# Patient Record
Sex: FEMALE | Race: WHITE | Employment: OTHER | URBAN - METROPOLITAN AREA
[De-identification: names, ages, dates, MRNs, and addresses within clinical notes are randomized per-mention and may not be internally consistent; named-entity substitution may affect disease eponyms.]

---

## 2017-10-21 ENCOUNTER — HOSPITAL ENCOUNTER (OUTPATIENT)
Age: 72
Discharge: HOME | End: 2017-10-21
Attending: FAMILY MEDICINE
Payer: MEDICARE

## 2017-10-21 VITALS
SYSTOLIC BLOOD PRESSURE: 116 MMHG | RESPIRATION RATE: 20 BRPM | OXYGEN SATURATION: 99 % | WEIGHT: 126 LBS | HEART RATE: 79 BPM | DIASTOLIC BLOOD PRESSURE: 64 MMHG | TEMPERATURE: 98 F | BODY MASS INDEX: 21.51 KG/M2 | HEIGHT: 64 IN

## 2017-10-21 DIAGNOSIS — J02.0 STREPTOCOCCAL SORE THROAT: Primary | ICD-10-CM

## 2017-10-21 PROCEDURE — 99203 OFFICE O/P NEW LOW 30 MIN: CPT

## 2017-10-21 PROCEDURE — 99204 OFFICE O/P NEW MOD 45 MIN: CPT

## 2017-10-21 PROCEDURE — 87430 STREP A AG IA: CPT

## 2017-10-21 RX ORDER — ATORVASTATIN CALCIUM 10 MG/1
10 TABLET, FILM COATED ORAL NIGHTLY
COMMUNITY
End: 2020-01-14

## 2017-10-21 RX ORDER — AMOXICILLIN 875 MG/1
875 TABLET, COATED ORAL 2 TIMES DAILY
Qty: 20 TABLET | Refills: 0 | Status: SHIPPED | OUTPATIENT
Start: 2017-10-21 | End: 2017-10-31

## 2017-10-21 RX ORDER — LEVOTHYROXINE SODIUM 0.12 MG/1
125 TABLET ORAL
COMMUNITY
End: 2020-01-14

## 2017-10-21 RX ORDER — AMLODIPINE BESYLATE 10 MG/1
10 TABLET ORAL DAILY
COMMUNITY
End: 2020-01-14

## 2017-10-21 NOTE — ED INITIAL ASSESSMENT (HPI)
C/o sore throat, chills, cough, runny nose since Thursday; had an episode of diarrhea yesterday;  recently positive for strep

## 2017-10-21 NOTE — ED PROVIDER NOTES
Patient Seen in: [address]    History   Patient presents with:  Sore Throat    Stated Complaint: sore throat     HPI    Patient here with sore throat for 3 days.   No travel,  is sick contact who recently had stre bilateral  NOSE: nasal turbinates boggy  THROAT: post pharynx injected with moderate erythema and +1 tonsillar enlargement bilaterally, uvula midline, no pointing, no stridor  NECK: supple, no adenopathy, no thyromegaly  LUNGS:  no resp distress, lungs yareli

## 2018-01-07 ENCOUNTER — HOSPITAL ENCOUNTER (OUTPATIENT)
Age: 73
Discharge: HOME | End: 2018-01-07
Attending: FAMILY MEDICINE
Payer: MEDICARE

## 2018-01-07 VITALS
DIASTOLIC BLOOD PRESSURE: 69 MMHG | HEART RATE: 88 BPM | SYSTOLIC BLOOD PRESSURE: 150 MMHG | RESPIRATION RATE: 18 BRPM | BODY MASS INDEX: 20.24 KG/M2 | HEIGHT: 64.5 IN | TEMPERATURE: 98 F | WEIGHT: 120 LBS | OXYGEN SATURATION: 100 %

## 2018-01-07 DIAGNOSIS — J02.0 STREPTOCOCCAL SORE THROAT: Primary | ICD-10-CM

## 2018-01-07 DIAGNOSIS — H10.33 ACUTE CONJUNCTIVITIS OF BOTH EYES, UNSPECIFIED ACUTE CONJUNCTIVITIS TYPE: ICD-10-CM

## 2018-01-07 LAB — S PYO AG THROAT QL: POSITIVE

## 2018-01-07 PROCEDURE — 99213 OFFICE O/P EST LOW 20 MIN: CPT

## 2018-01-07 PROCEDURE — 99214 OFFICE O/P EST MOD 30 MIN: CPT

## 2018-01-07 PROCEDURE — 87430 STREP A AG IA: CPT

## 2018-01-07 RX ORDER — TOBRAMYCIN 3 MG/ML
1 SOLUTION/ DROPS OPHTHALMIC EVERY 4 HOURS
Qty: 5 ML | Refills: 0 | Status: SHIPPED | OUTPATIENT
Start: 2018-01-07 | End: 2018-01-14

## 2018-01-07 RX ORDER — AMOXICILLIN 875 MG/1
875 TABLET, COATED ORAL 2 TIMES DAILY
Qty: 20 TABLET | Refills: 0 | Status: SHIPPED | OUTPATIENT
Start: 2018-01-07 | End: 2018-01-17

## 2018-01-07 NOTE — ED INITIAL ASSESSMENT (HPI)
Pt presents to the IC with c/o bilateral eye redness that started this morning. Pt wears contacts and has them in right now. +discharge, yellow. Sick contacts at home with URI's.

## 2018-01-25 ENCOUNTER — HOSPITAL ENCOUNTER (OUTPATIENT)
Age: 73
Discharge: HOME | End: 2018-01-25
Attending: EMERGENCY MEDICINE
Payer: MEDICARE

## 2018-01-25 VITALS
HEART RATE: 73 BPM | WEIGHT: 125 LBS | OXYGEN SATURATION: 100 % | BODY MASS INDEX: 21.34 KG/M2 | HEIGHT: 64 IN | SYSTOLIC BLOOD PRESSURE: 137 MMHG | RESPIRATION RATE: 14 BRPM | TEMPERATURE: 98 F | DIASTOLIC BLOOD PRESSURE: 82 MMHG

## 2018-01-25 DIAGNOSIS — J02.0 STREP PHARYNGITIS: Primary | ICD-10-CM

## 2018-01-25 LAB — S PYO AG THROAT QL: POSITIVE

## 2018-01-25 PROCEDURE — 87430 STREP A AG IA: CPT

## 2018-01-25 PROCEDURE — 99214 OFFICE O/P EST MOD 30 MIN: CPT

## 2018-01-25 PROCEDURE — 99213 OFFICE O/P EST LOW 20 MIN: CPT

## 2018-01-25 RX ORDER — PENICILLIN V POTASSIUM 500 MG/1
500 TABLET ORAL 2 TIMES DAILY
Qty: 20 TABLET | Refills: 0 | Status: SHIPPED | OUTPATIENT
Start: 2018-01-25 | End: 2018-02-04

## 2018-01-26 NOTE — ED PROVIDER NOTES
Patient Seen in: [address]    History   Patient presents with:  Sore Throat    Stated Complaint: sore throat exposed to strep     HPI    Patient is a 70-year-old female who presents to immediate care complaining of a so are normal. Pupils are equal, round, and reactive to light. Neck: Normal range of motion. Neck supple. Cardiovascular: Normal rate, regular rhythm and normal heart sounds. No murmur heard.   Pulmonary/Chest: Breath sounds normal. No respiratory distr

## 2019-01-07 ENCOUNTER — HOSPITAL ENCOUNTER (OUTPATIENT)
Age: 74
Discharge: HOME | End: 2019-01-07
Attending: EMERGENCY MEDICINE
Payer: MEDICARE

## 2019-01-07 VITALS
HEIGHT: 64 IN | BODY MASS INDEX: 21.51 KG/M2 | DIASTOLIC BLOOD PRESSURE: 67 MMHG | OXYGEN SATURATION: 99 % | HEART RATE: 107 BPM | WEIGHT: 126 LBS | SYSTOLIC BLOOD PRESSURE: 130 MMHG | RESPIRATION RATE: 20 BRPM | TEMPERATURE: 99 F

## 2019-01-07 DIAGNOSIS — J02.0 STREPTOCOCCAL SORE THROAT: Primary | ICD-10-CM

## 2019-01-07 LAB — S PYO AG THROAT QL: POSITIVE

## 2019-01-07 PROCEDURE — 99214 OFFICE O/P EST MOD 30 MIN: CPT

## 2019-01-07 PROCEDURE — 99213 OFFICE O/P EST LOW 20 MIN: CPT

## 2019-01-07 PROCEDURE — 87430 STREP A AG IA: CPT

## 2019-01-07 RX ORDER — FLUTICASONE PROPIONATE 50 MCG
2 SPRAY, SUSPENSION (ML) NASAL DAILY
Qty: 16 G | Refills: 0 | Status: SHIPPED | OUTPATIENT
Start: 2019-01-07 | End: 2019-02-05

## 2019-01-07 RX ORDER — AMOXICILLIN 875 MG/1
875 TABLET, COATED ORAL 2 TIMES DAILY
Qty: 20 TABLET | Refills: 0 | Status: SHIPPED | OUTPATIENT
Start: 2019-01-07 | End: 2019-01-17

## 2019-01-07 NOTE — ED INITIAL ASSESSMENT (HPI)
Patient states having sore throat pain, nasal congestion, productive and dry cough since last Wednesday. Patient states having low grade fever 100F this morning. Patient denies hest pain or SOB.

## 2019-01-07 NOTE — ED PROVIDER NOTES
Patient Seen in: [address]    History   Patient presents with:  Sore Throat    Stated Complaint: sore throat     HPI    Patient here complaining of sore throat for 3 days.   Notes pain is described as burning and aching clear  EARS:TM's clear bilateral  THROAT: post pharynx injected, uvula midline, no pointing, no stridor  LUNGS: no wheezing, no resp distress, lungs clear bilateral  SKIN: good skin turgor, no obvious rashes  NECK: supple, no meningeal signs, b/l ant.  Cerv

## 2019-02-05 ENCOUNTER — HOSPITAL ENCOUNTER (OUTPATIENT)
Age: 74
Discharge: HOME | End: 2019-02-05
Attending: FAMILY MEDICINE
Payer: MEDICARE

## 2019-02-05 VITALS
SYSTOLIC BLOOD PRESSURE: 151 MMHG | TEMPERATURE: 99 F | WEIGHT: 126 LBS | HEART RATE: 96 BPM | RESPIRATION RATE: 17 BRPM | DIASTOLIC BLOOD PRESSURE: 71 MMHG | BODY MASS INDEX: 21.51 KG/M2 | HEIGHT: 64 IN | OXYGEN SATURATION: 100 %

## 2019-02-05 DIAGNOSIS — J02.0 STREPTOCOCCAL SORE THROAT: Primary | ICD-10-CM

## 2019-02-05 LAB — S PYO AG THROAT QL: POSITIVE

## 2019-02-05 PROCEDURE — 99213 OFFICE O/P EST LOW 20 MIN: CPT

## 2019-02-05 PROCEDURE — 87430 STREP A AG IA: CPT

## 2019-02-05 PROCEDURE — 99214 OFFICE O/P EST MOD 30 MIN: CPT

## 2019-02-05 RX ORDER — AMOXICILLIN AND CLAVULANATE POTASSIUM 875; 125 MG/1; MG/1
1 TABLET, FILM COATED ORAL 2 TIMES DAILY
Qty: 20 TABLET | Refills: 0 | Status: SHIPPED | OUTPATIENT
Start: 2019-02-05 | End: 2019-02-15

## 2019-02-05 NOTE — ED PROVIDER NOTES
Pt seen in [facility]      Stated Complaint: Patient presents with:  Sore Throat      --------------   RN assessment:     st  --------------    CC:  st    HPI:  Cecile Kincaid is a 68year old female--p/w co st, onset was for  jaundice and odynophagia  Genitourinary:negative for decreased stream and nocturia  Behavioral/Psych: negative for aggressive behavior and hallucinations  10 point review of systems is otherwise negative.     Objective   Vitals Ranges and Last Value: Course:  Labs/Tests reviewed:   Labs Reviewed   [facility] POCT RAPID STREP - Abnormal; Notable for the following components:       Result Value    POCT Rapid Strep Positive (*)     All other components within normal limits             ---------------------------

## 2019-02-05 NOTE — ED INITIAL ASSESSMENT (HPI)
Pt presents to the IC with c/o a sore throat. Pt reports a sore throat in early January and was dx with strep throat. Pt notes the symptoms resolved 100% with antibiotics. Symptoms for this round of sore throat started yesterday afternoon. No fever.  No annette

## 2019-03-09 ENCOUNTER — HOSPITAL ENCOUNTER (OUTPATIENT)
Age: 74
Discharge: HOME | End: 2019-03-09
Attending: EMERGENCY MEDICINE
Payer: MEDICARE

## 2019-03-09 VITALS
TEMPERATURE: 98 F | OXYGEN SATURATION: 100 % | HEART RATE: 91 BPM | WEIGHT: 126 LBS | RESPIRATION RATE: 18 BRPM | BODY MASS INDEX: 21.51 KG/M2 | DIASTOLIC BLOOD PRESSURE: 76 MMHG | HEIGHT: 64 IN | SYSTOLIC BLOOD PRESSURE: 130 MMHG

## 2019-03-09 DIAGNOSIS — J02.0 STREPTOCOCCAL SORE THROAT: Primary | ICD-10-CM

## 2019-03-09 LAB — S PYO AG THROAT QL: POSITIVE

## 2019-03-09 PROCEDURE — 99214 OFFICE O/P EST MOD 30 MIN: CPT

## 2019-03-09 PROCEDURE — 99213 OFFICE O/P EST LOW 20 MIN: CPT

## 2019-03-09 PROCEDURE — 87430 STREP A AG IA: CPT

## 2019-03-09 RX ORDER — AMOXICILLIN 875 MG/1
875 TABLET, COATED ORAL 2 TIMES DAILY
Qty: 20 TABLET | Refills: 0 | Status: SHIPPED | OUTPATIENT
Start: 2019-03-09 | End: 2019-03-19

## 2019-03-09 NOTE — ED PROVIDER NOTES
Patient Seen in: [address]    History   Patient presents with:  Sore Throat    Stated Complaint: strep    HPI    Patient here complaining of sore throat for 1 days.   Notes pain is described as achy and sore and rates it normal limits no acute distress  HEAD: normocephalic, atraumatic  EYES: sclera non icteric bilateral, conjunctiva clear  EARS:TM's clear bilateral  THROAT: Tonsils red and inflamed post pharynx injected, uvula midline, no pointing, no stridor  LUNGS: No wh

## 2020-01-14 PROBLEM — I10 ESSENTIAL HYPERTENSION: Status: ACTIVE | Noted: 2020-01-14

## 2020-02-16 ENCOUNTER — HOSPITAL ENCOUNTER (OUTPATIENT)
Age: 75
Discharge: HOME | End: 2020-02-16
Attending: EMERGENCY MEDICINE
Payer: MEDICARE

## 2020-02-16 VITALS
OXYGEN SATURATION: 99 % | WEIGHT: 128 LBS | TEMPERATURE: 100 F | RESPIRATION RATE: 20 BRPM | HEART RATE: 112 BPM | SYSTOLIC BLOOD PRESSURE: 148 MMHG | BODY MASS INDEX: 22 KG/M2 | DIASTOLIC BLOOD PRESSURE: 73 MMHG

## 2020-02-16 DIAGNOSIS — J02.0 STREP PHARYNGITIS: Primary | ICD-10-CM

## 2020-02-16 LAB — S PYO AG THROAT QL: POSITIVE

## 2020-02-16 PROCEDURE — 87430 STREP A AG IA: CPT

## 2020-02-16 PROCEDURE — 99213 OFFICE O/P EST LOW 20 MIN: CPT

## 2020-02-16 PROCEDURE — 99214 OFFICE O/P EST MOD 30 MIN: CPT

## 2020-02-16 RX ORDER — PENICILLIN V POTASSIUM 500 MG/1
500 TABLET ORAL 2 TIMES DAILY
Qty: 20 TABLET | Refills: 0 | Status: SHIPPED | OUTPATIENT
Start: 2020-02-16

## 2020-02-16 NOTE — ED PROVIDER NOTES
Patient Seen in: [address]      History   Patient presents with:  Cough/URI  Sore Throat    Stated Complaint: cough; sore throat    HPI    68-year-old female patient presents her complaining of dry cough with a sore thr Notable for the following components:       Result Value    POCT Rapid Strep Positive (*)     All other components within normal limits               MDM     Discussed the treatment plan with patient. Patient not allergic to penicillin.               Dispo

## 2020-08-24 ENCOUNTER — HOSPITAL ENCOUNTER (OUTPATIENT)
Age: 75
Discharge: HOME | End: 2020-08-24
Payer: MEDICARE

## 2020-08-24 VITALS
WEIGHT: 123 LBS | DIASTOLIC BLOOD PRESSURE: 77 MMHG | HEART RATE: 81 BPM | BODY MASS INDEX: 21 KG/M2 | OXYGEN SATURATION: 100 % | RESPIRATION RATE: 14 BRPM | SYSTOLIC BLOOD PRESSURE: 156 MMHG | TEMPERATURE: 98 F

## 2020-08-24 DIAGNOSIS — L03.811 CELLULITIS OF HEAD EXCEPT FACE: Primary | ICD-10-CM

## 2020-08-24 DIAGNOSIS — T63.481A INSECT STINGS, ACCIDENTAL OR UNINTENTIONAL, INITIAL ENCOUNTER: ICD-10-CM

## 2020-08-24 DIAGNOSIS — R42 VERTIGO: ICD-10-CM

## 2020-08-24 PROCEDURE — 99203 OFFICE O/P NEW LOW 30 MIN: CPT | Performed by: NURSE PRACTITIONER

## 2020-08-24 RX ORDER — CEPHALEXIN 500 MG/1
500 CAPSULE ORAL 4 TIMES DAILY
Qty: 40 CAPSULE | Refills: 0 | Status: SHIPPED | OUTPATIENT
Start: 2020-08-24 | End: 2020-09-03

## 2020-08-24 NOTE — ED PROVIDER NOTES
Patient Seen in: [facility]    History   CC: ear itching and redness  HPI: Sg Nelson 76year old female  who presents c/o right ear itching, redness and swelling s/p bee or wasp sting 2 days ago.  States x24hrs ADULT OR),  Take by mouth. aspirin 81 MG Oral Tab,  Take 81 mg by mouth daily. penicillin v potassium 500 MG Oral Tab,  Take 1 tablet (500 mg total) by mouth 2 (two) times daily.   Patient not taking: Reported on 8/24/2020            Constitutional and movements of all extremities with full ROM noted, foot press and hand grasp strength equal bilat, radial pulses 2+ bilat. Psych - Interactive and appropriate      ED Course   Labs Reviewed - No data to display    MDM     Neuro intact.  +reproducible sensat

## 2022-10-28 ENCOUNTER — HOSPITAL ENCOUNTER (OUTPATIENT)
Age: 77
Discharge: HOME | End: 2022-10-28
Payer: MEDICARE

## 2022-10-28 VITALS
RESPIRATION RATE: 18 BRPM | SYSTOLIC BLOOD PRESSURE: 119 MMHG | HEART RATE: 90 BPM | OXYGEN SATURATION: 100 % | DIASTOLIC BLOOD PRESSURE: 61 MMHG | TEMPERATURE: 101 F

## 2022-10-28 DIAGNOSIS — U07.1 LAB TEST POSITIVE FOR DETECTION OF COVID-19 VIRUS: Primary | ICD-10-CM

## 2022-10-28 DIAGNOSIS — R50.9 FEVER: ICD-10-CM

## 2022-10-28 LAB
POCT INFLUENZA A: NEGATIVE
POCT INFLUENZA B: NEGATIVE
SARS-COV-2 RNA RESP QL NAA+PROBE: DETECTED

## 2022-10-28 PROCEDURE — 87502 INFLUENZA DNA AMP PROBE: CPT | Performed by: EMERGENCY MEDICINE

## 2022-10-28 PROCEDURE — U0002 COVID-19 LAB TEST NON-CDC: HCPCS | Performed by: EMERGENCY MEDICINE

## 2022-10-28 PROCEDURE — 99213 OFFICE O/P EST LOW 20 MIN: CPT | Performed by: EMERGENCY MEDICINE

## 2022-10-28 RX ORDER — ACETAMINOPHEN 325 MG/1
650 TABLET ORAL ONCE
Status: COMPLETED | OUTPATIENT
Start: 2022-10-28 | End: 2022-10-28

## 2022-10-28 NOTE — DISCHARGE INSTRUCTIONS
Stop cholesterol meds 5 days on Paxlovid and 5 days after Paxlovid  Care handout on Paxlovid. Do not take this medication unless you are sure you want to take it. The FDA did not authorize this medication, but the emergency release date. Call primary as needed for follow up.

## 2022-10-29 RX ORDER — NIRMATRELVIR AND RITONAVIR 300-100 MG
KIT ORAL
Qty: 30 TABLET | Refills: 0 | Status: SHIPPED | OUTPATIENT
Start: 2022-10-29

## 2023-10-08 ENCOUNTER — HOSPITAL ENCOUNTER (OUTPATIENT)
Age: 78
Discharge: HOME | End: 2023-10-08
Payer: MEDICARE

## 2023-10-08 VITALS
HEART RATE: 104 BPM | RESPIRATION RATE: 20 BRPM | DIASTOLIC BLOOD PRESSURE: 72 MMHG | TEMPERATURE: 100 F | OXYGEN SATURATION: 99 % | SYSTOLIC BLOOD PRESSURE: 133 MMHG

## 2023-10-08 DIAGNOSIS — R50.9 FEVER: Primary | ICD-10-CM

## 2023-10-08 DIAGNOSIS — U07.1 COVID: ICD-10-CM

## 2023-10-08 LAB
POCT INFLUENZA A: NEGATIVE
POCT INFLUENZA B: NEGATIVE
SARS-COV-2 RNA RESP QL NAA+PROBE: DETECTED

## 2023-11-18 ENCOUNTER — HOSPITAL ENCOUNTER (OUTPATIENT)
Age: 78
Discharge: HOME | End: 2023-11-18
Payer: MEDICARE

## 2023-11-18 VITALS
RESPIRATION RATE: 20 BRPM | DIASTOLIC BLOOD PRESSURE: 66 MMHG | SYSTOLIC BLOOD PRESSURE: 134 MMHG | HEART RATE: 100 BPM | OXYGEN SATURATION: 98 % | TEMPERATURE: 100 F

## 2023-11-18 DIAGNOSIS — R50.9 FEVER: Primary | ICD-10-CM

## 2023-11-18 DIAGNOSIS — B34.9 VIRAL ILLNESS: ICD-10-CM

## 2023-11-18 LAB
POCT INFLUENZA A: NEGATIVE
POCT INFLUENZA B: NEGATIVE

## 2025-01-03 ENCOUNTER — HOSPITAL ENCOUNTER (OUTPATIENT)
Age: 80
Discharge: HOME | End: 2025-01-03
Payer: MEDICARE

## 2025-01-03 VITALS
SYSTOLIC BLOOD PRESSURE: 103 MMHG | HEART RATE: 85 BPM | OXYGEN SATURATION: 99 % | RESPIRATION RATE: 20 BRPM | TEMPERATURE: 100 F | DIASTOLIC BLOOD PRESSURE: 49 MMHG

## 2025-01-03 DIAGNOSIS — R50.9 FEVER: ICD-10-CM

## 2025-01-03 DIAGNOSIS — J10.1 INFLUENZA A: Primary | ICD-10-CM

## 2025-01-03 LAB
POCT INFLUENZA A: POSITIVE
POCT INFLUENZA B: NEGATIVE
SARS-COV-2 RNA RESP QL NAA+PROBE: NOT DETECTED

## 2025-01-03 RX ORDER — BENZONATATE 100 MG/1
100 CAPSULE ORAL 3 TIMES DAILY PRN
Qty: 30 CAPSULE | Refills: 0 | Status: SHIPPED | OUTPATIENT
Start: 2025-01-03 | End: 2025-01-13

## 2025-01-03 RX ORDER — OSELTAMIVIR PHOSPHATE 75 MG/1
75 CAPSULE ORAL 2 TIMES DAILY
Qty: 10 CAPSULE | Refills: 0 | Status: SHIPPED | OUTPATIENT
Start: 2025-01-03 | End: 2025-01-08

## 2025-01-03 NOTE — DISCHARGE INSTRUCTIONS
As discussed, you are positive for influenza A.  You are negative for influenza B and COVID-19.  You are eligible to receive Tamiflu.  Take twice a day for 5 days.  Take with food and water.  Most common adverse side effects are GI complaints.    Drink plenty water and electrolytes.  Get plenty of rest and sleep.  Tylenol every 4 hours and Motrin for 6 hours.  The flu is very contagious.  It is typically a 5-day illness.  You must stay inside for total 5 days since symptoms began.    If you have any chest pain, dizziness, lightness, palpitations, shortness of breath, please go to ER.

## 2025-01-03 NOTE — ED PROVIDER NOTES
Patient Seen in: [facility] [place]      History     Chief Complaint   Patient presents with    Fever     Stated Complaint: Fever    Subjective: This is a 79-year-old female, past medical history of hypertension, hyperlipidemia, thyroid disease, Hashimoto thyroiditis, WPW, presents to immediate care clinic with complaints of: Fever, chills, body aches, cough.  Symptoms started yesterday.  Fever Tmax 101.6.  She arrives with low-grade temperature.  Last dose of antipyretics last night.  She is also reporting mild cough with her symptoms.  No congestion, runny nose, sore throat, ear discomfort.  No abdominal pain, nausea, vomiting, diarrhea.  She states that her family lives in town and they have been running around 2 events in the city for the past 2 weeks.  No known sick contacts.  Speaking in complete and full sentences without difficulty.  AOx4.  The history is provided by the patient.             Objective:     Past Medical History:    Cataract    cataract surgery right eye april 2023    Essential hypertension    Hashimoto thyroiditis    History of colpocleisis    Hyperlipidemia    Thyroid disease    Bethany-Parkinson-White syndrome              History reviewed. No pertinent surgical history.             Social History     Socioeconomic History    Marital status:    Tobacco Use    Smoking status: Never    Smokeless tobacco: Never   Vaping Use    Vaping status: Never Used   Substance and Sexual Activity    Alcohol use: Yes     Comment: occ    Drug use: Never     Social Drivers of Health     Food Insecurity: No Food Insecurity (8/22/2024)    Received from [facility]    Food Insecurity     Currently or in the past 3 months, have you worried your food would run out before you had money to buy more?: No     In the past 12 months, have you run out of food or been unable to get more?: No   Transportation Needs: No Transportation Needs (8/22/2024)    [facility]    Transportation Needs     Currently or in the past 3 months, has lack of transportation kept you from medical appointments, getting food or medicine, or providing care to a family member?: Unrecognized value     Medical Transportation Needs?: No    Received from [facility], [facility]    Social Connections    Received from [facility]              Review of Systems   Constitutional:  Positive for chills, fatigue and fever.   HENT: Negative.     Eyes: Negative.    Respiratory:  Positive for cough. Negative for chest tightness, shortness of breath, wheezing and stridor.    Cardiovascular: Negative.    Genitourinary: Negative.    Musculoskeletal:  Positive for arthralgias and myalgias. Negative for neck pain.   Skin: Negative.    Neurological: Negative.        Positive for stated complaint: Fever  Other systems are as noted in HPI.  Constitutional and vital signs reviewed.      All other systems reviewed and negative except as noted above.    Physical Exam     ED Triage Vitals [01/03/25 0921]   /49   Pulse 85   Resp 20   Temp 99.7 °F (37.6 °C)   Temp src Oral   SpO2 99 %   O2 Device None (Room air)       Current Vitals:   Vital Signs  BP: 103/49  Pulse: 85  Resp: 20  Temp: 99.7 °F (37.6 °C)  Temp src: Oral    Oxygen Therapy  SpO2: 99 %  O2 Device: None (Room air)        Physical Exam  Constitutional:       General: She is not in acute distress.     Appearance: Normal appearance. She is not ill-appearing or toxic-appearing.   HENT:      Head: Normocephalic.      Jaw: There is normal jaw occlusion.      Right Ear: Tympanic membrane, ear canal and external ear normal.      Left Ear: Tympanic membrane, ear canal and external ear normal.      Nose: Nose normal.      Mouth/Throat:      Lips: Pink. No lesions.      Mouth: Mucous membranes are moist. No oral lesions.      Tongue: No lesions.      Palate: No lesions.      Pharynx: Oropharynx is clear. Uvula  midline. No oropharyngeal exudate, posterior oropharyngeal erythema, uvula swelling or postnasal drip.   Eyes:      Extraocular Movements: Extraocular movements intact.      Pupils: Pupils are equal, round, and reactive to light.   Cardiovascular:      Rate and Rhythm: Normal rate and regular rhythm.      Pulses: Normal pulses.      Heart sounds: Normal heart sounds.   Pulmonary:      Effort: Pulmonary effort is normal. No respiratory distress.      Breath sounds: Normal breath sounds. No stridor. No wheezing, rhonchi or rales.   Chest:      Chest wall: No tenderness.   Musculoskeletal:         General: Normal range of motion.      Cervical back: Normal range of motion.   Skin:     General: Skin is warm.      Capillary Refill: Capillary refill takes less than 2 seconds.   Neurological:      General: No focal deficit present.      Mental Status: She is alert and oriented to person, place, and time.             ED Course     Labs Reviewed   POCT FLU TEST - Abnormal; Notable for the following components:       Result Value    POCT INFLUENZA A Positive (*)     All other components within normal limits    Narrative:     This assay is a rapid molecular in vitro test utilizing nucleic acid amplification of influenza A and B viral RNA.   RAPID SARS-COV-2 BY PCR - Normal                   MDM      Differentials considered include: Influenza A, influenza B, COVID-19, viral URI, pneumonia.    Patient negative for COVID-19.    Her lungs are clear on exam.  No adventitious breath sounds.  No evidence to suggest pneumonia or bronchitis.    Patient is positive for influenza A.  She is eligible to receive Tamiflu.  Tamiflu cross referenced with current med list.  No interactions found.    Will also prescribe cough suppressant.    Patient is aware she is considered contagious for a total of 5 days since symptom onset.    Educated patient on importance of Tylenol, Motrin, fluids, hydration, rest.  She verbalized understand agrees  with plan of care.    She is aware of signs symptoms that warrant immediate ER evaluation.        Medical Decision Making      Disposition and Plan     Clinical Impression:  1. Influenza A    2. Fever         Disposition:  Discharge  1/3/2025 10:01 am    Follow-up:  Humberto Cerda  [address] [place] [address]  2ND FLOOR  [facility] [address]  [phone]      As needed          Medications Prescribed:  Current Discharge Medication List        START taking these medications    Details   oseltamivir (TAMIFLU) 75 MG Oral Cap Take 1 capsule (75 mg total) by mouth 2 (two) times daily for 5 days.  Qty: 10 capsule, Refills: 0      benzonatate (TESSALON PERLES) 100 MG Oral Cap Take 1 capsule (100 mg total) by mouth 3 (three) times daily as needed for cough.  Qty: 30 capsule, Refills: 0                 Supplementary Documentation:

## 2025-05-08 ENCOUNTER — HOSPITAL ENCOUNTER (OUTPATIENT)
Age: 80
Discharge: HOME | End: 2025-05-08
Payer: MEDICARE

## 2025-05-08 VITALS
SYSTOLIC BLOOD PRESSURE: 134 MMHG | HEART RATE: 89 BPM | OXYGEN SATURATION: 99 % | TEMPERATURE: 99 F | RESPIRATION RATE: 18 BRPM | DIASTOLIC BLOOD PRESSURE: 66 MMHG

## 2025-05-08 DIAGNOSIS — N39.0 ACUTE UTI: Primary | ICD-10-CM

## 2025-05-08 LAB
GLUCOSE UR STRIP-MCNC: NEGATIVE MG/DL
KETONES UR STRIP-MCNC: 15 MG/DL
NITRITE UR QL STRIP: POSITIVE
PH UR STRIP: 7.5 [PH]
PROT UR STRIP-MCNC: >=300 MG/DL
SP GR UR STRIP: 1.02
UROBILINOGEN UR STRIP-ACNC: <2 MG/DL

## 2025-05-08 PROCEDURE — 99213 OFFICE O/P EST LOW 20 MIN: CPT | Performed by: NURSE PRACTITIONER

## 2025-05-08 PROCEDURE — 81002 URINALYSIS NONAUTO W/O SCOPE: CPT | Performed by: NURSE PRACTITIONER

## 2025-05-08 RX ORDER — CEPHALEXIN 500 MG/1
500 CAPSULE ORAL 2 TIMES DAILY
Qty: 14 CAPSULE | Refills: 0 | Status: SHIPPED | OUTPATIENT
Start: 2025-05-08 | End: 2025-05-15

## 2025-05-08 NOTE — ED PROVIDER NOTES
Patient Seen in: [facility] [place]      History     Chief Complaint   Patient presents with    Urinary Symptoms     Stated Complaint: Urinary Symptoms    Subjective:   HPI      79 yr old female here for dysuria, urinary frequency and urgency, as well as low grade fever this morning. Denies vomiting, diarrhea, nausea, abd pain, back or flank pain.   Denies hx recurrent UTI, or recent abx use.             Objective:     Past Medical History:    Cataract    cataract surgery right eye april 2023    Essential hypertension    Hashimoto thyroiditis    History of colpocleisis    Hyperlipidemia    Thyroid disease    Bethany-Parkinson-White syndrome              History reviewed. No pertinent surgical history.             Social History     Socioeconomic History    Marital status:    Tobacco Use    Smoking status: Never    Smokeless tobacco: Never   Vaping Use    Vaping status: Never Used   Substance and Sexual Activity    Alcohol use: Never    Drug use: Never     Social Drivers of Health     Food Insecurity: No Food Insecurity (2/24/2025)    Received from [facility]    Food Insecurity     Currently or in the past 3 months, have you worried your food would run out before you had money to buy more?: No     In the past 12 months, have you run out of food or been unable to get more?: No   Transportation Needs: No Transportation Needs (2/24/2025)    Received from [facility]    Transportation Needs     Currently or in the past 3 months, has lack of transportation kept you from medical appointments, getting food or medicine, or providing care to a family member?: No     Medical Transportation Needs?: No    Received from [facility]              Review of Systems    Positive for stated complaint: Urinary Symptoms  Other systems are as noted in HPI.  Constitutional and vital signs reviewed.      All other systems reviewed and negative except as noted  above.                  Physical Exam     ED Triage Vitals [05/08/25 1210]   /66   Pulse 89   Resp 18   Temp 98.6 °F (37 °C)   Temp src Oral   SpO2 99 %   O2 Device None (Room air)       Current Vitals:   Vital Signs  BP: 134/66  Pulse: 89  Resp: 18  Temp: 98.6 °F (37 °C)  Temp src: Oral    Oxygen Therapy  SpO2: 99 %  O2 Device: None (Room air)        Physical Exam  Vitals and nursing note reviewed.   Constitutional:       General: She is not in acute distress.     Appearance: Normal appearance. She is not ill-appearing, toxic-appearing or diaphoretic.   Cardiovascular:      Rate and Rhythm: Normal rate.   Pulmonary:      Effort: Pulmonary effort is normal. No respiratory distress.   Abdominal:      General: Abdomen is flat. There is no distension.      Palpations: Abdomen is soft.      Tenderness: There is no abdominal tenderness. There is no right CVA tenderness, left CVA tenderness or guarding.   Skin:     General: Skin is warm and dry.      Findings: No rash.   Neurological:      Mental Status: She is alert and oriented to person, place, and time.   Psychiatric:         Mood and Affect: Mood normal.         Behavior: Behavior normal.                           ED Course     Labs Reviewed   [facility] POCT URINALYSIS DIPSTICK - Abnormal; Notable for the following components:       Result Value    Urine Clarity Cloudy (*)     Protein urine >=300 (*)     Ketone, Urine 15 (*)     Bilirubin, Urine Moderate (*)     Blood, Urine Moderate (*)     Nitrite Urine Positive (*)     Leukocyte esterase urine Small (*)     All other components within normal limits   URINE CULTURE, ROUTINE       ED Course as of 05/08/25 1228  ------------------------------------------------------------  Time: 05/08 1222  Value: Leukocyte esterase urine(!): Small  Comment: (Reviewed)  ------------------------------------------------------------  Time: 05/08 1222  Value: Nitrite Urine(!): Positive  Comment:  (Reviewed)  ------------------------------------------------------------  Time: 05/08 1222  Value: Blood, Urine(!): Moderate  Comment: (Reviewed)  ------------------------------------------------------------  Time: 05/08 1222  Value: Bilirubin, Urine(!): Moderate  Comment: (Reviewed)  ------------------------------------------------------------  Time: 05/08 1222  Value: Ketone, Urine(!): 15  Comment: (Reviewed)  ------------------------------------------------------------  Time: 05/08 1222  Value: Protein urine(!): >=300  Comment: (Reviewed)  ------------------------------------------------------------  Time: 05/08 1222  Value: Urine Clarity(!): Cloudy  Comment: (Reviewed)                                MDM     79 yr old female here for dysuria, frequency, urgency x 1 day.    ON exam, pt well appearing otherwise. Soft, nontender abdomen. No guarding. No CVAT.    Differential diagnoses reflecting the complexity of care include but are not limited to uti, pyelo, kidney stone.    Comorbidities that add complexity to management include: HTN, thyroid  History obtained by an independent source was from: patient  My independent interpretations of studies include: Udip +nitrite, leuk, blood, bili, protein  Culture sent.    Shared decision making was done by: patient, myself  Discussions of management was done with: patient    Patient is well appearing, non-toxic and in no acute distress.  Vital signs are stable.   Discussed results and symptoms> will treat for UTI today, keflex x 7 days to pharm on file.  All questions answered. Return and ER precautions given.    Counseled: Patient, regarding diagnosis, regarding treatment plan, regarding diagnostic results, regarding prescription, I have discussed with the patient the results of tests, differential diagnosis, and warning signs and symptoms that should prompt immediate return. The patient understands these instructions and agrees to the follow-up plan provided. There is no  barriers to learning. Appropriate f/u given. Patient agrees to return for any concerns/ problems/complications.            Medical Decision Making      Disposition and Plan     Clinical Impression:  1. Acute UTI         Disposition:  Discharge  5/8/2025 12:26 pm    Follow-up:  Humberto Cerda  [address] [place] [address]  2ND FLOOR  [facility] [address]  [phone]      As needed          Medications Prescribed:  Current Discharge Medication List        START taking these medications    Details   cephALEXin 500 MG Oral Cap Take 1 capsule (500 mg total) by mouth 2 (two) times daily for 7 days.  Qty: 14 capsule, Refills: 0             Supplementary Documentation:

## 2025-05-08 NOTE — DISCHARGE INSTRUCTIONS
Follow up with your doctor this week to continue evaluation of UTI if symptoms persist.    Take antibiotic as directed.  Finish full amount.    A culture was sent so if this comes back positive for a bacteria not covered by antibiotic given, you will get a call from us to change medication.    Increase water intake, urinate often.    RETURN OR GO TO ED For worsening back pain, fevers, worsening abdominal pain, nausea and vomiting.